# Patient Record
Sex: OTHER/UNKNOWN | Race: WHITE | NOT HISPANIC OR LATINO | Employment: PART TIME | URBAN - METROPOLITAN AREA
[De-identification: names, ages, dates, MRNs, and addresses within clinical notes are randomized per-mention and may not be internally consistent; named-entity substitution may affect disease eponyms.]

---
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
## 2023-03-10 ENCOUNTER — TELEPHONE (OUTPATIENT)
Dept: HEALTH INFORMATION MANAGEMENT | Facility: OTHER | Age: 18
End: 2023-03-10

## 2023-03-10 ENCOUNTER — OFFICE VISIT (OUTPATIENT)
Dept: URGENT CARE | Facility: PHYSICIAN GROUP | Age: 18
End: 2023-03-10
Payer: COMMERCIAL

## 2023-03-10 VITALS
WEIGHT: 217 LBS | HEART RATE: 92 BPM | OXYGEN SATURATION: 95 % | SYSTOLIC BLOOD PRESSURE: 126 MMHG | RESPIRATION RATE: 16 BRPM | HEIGHT: 66 IN | BODY MASS INDEX: 34.87 KG/M2 | DIASTOLIC BLOOD PRESSURE: 82 MMHG | TEMPERATURE: 98.2 F

## 2023-03-10 DIAGNOSIS — J02.9 PHARYNGITIS, UNSPECIFIED ETIOLOGY: ICD-10-CM

## 2023-03-10 DIAGNOSIS — J45.909 ASTHMA, UNSPECIFIED ASTHMA SEVERITY, UNSPECIFIED WHETHER COMPLICATED, UNSPECIFIED WHETHER PERSISTENT: ICD-10-CM

## 2023-03-10 LAB
FLUAV RNA SPEC QL NAA+PROBE: NEGATIVE
FLUBV RNA SPEC QL NAA+PROBE: NEGATIVE
RSV RNA SPEC QL NAA+PROBE: NEGATIVE
S PYO DNA SPEC NAA+PROBE: NOT DETECTED
SARS-COV-2 RNA RESP QL NAA+PROBE: NEGATIVE

## 2023-03-10 PROCEDURE — 87651 STREP A DNA AMP PROBE: CPT | Performed by: NURSE PRACTITIONER

## 2023-03-10 PROCEDURE — 99213 OFFICE O/P EST LOW 20 MIN: CPT | Performed by: NURSE PRACTITIONER

## 2023-03-10 PROCEDURE — 0241U POCT CEPHEID COV-2, FLU A/B, RSV - PCR: CPT | Performed by: NURSE PRACTITIONER

## 2023-03-10 RX ORDER — ALBUTEROL SULFATE 90 UG/1
1-2 AEROSOL, METERED RESPIRATORY (INHALATION) EVERY 4 HOURS PRN
Qty: 8.5 G | Refills: 0 | Status: SHIPPED | OUTPATIENT
Start: 2023-03-10

## 2023-03-10 ASSESSMENT — FIBROSIS 4 INDEX: FIB4 SCORE: 0.32

## 2023-03-10 ASSESSMENT — ENCOUNTER SYMPTOMS
DIARRHEA: 0
SHORTNESS OF BREATH: 0
WHEEZING: 0
SORE THROAT: 1
DIZZINESS: 1
COUGH: 0
NAUSEA: 0
VOMITING: 0
FEVER: 1

## 2023-03-10 NOTE — PROGRESS NOTES
Subjective:     Tiffany Perez is a 17 y.o. adult who presents for Sore Throat (X 1 day ,exposed to strep,lightheaded )      Felt feverish ysterday. Improvement today. States it feels lke a cough is about to start. Brother has strep.     Pharyngitis   This is a new problem. The current episode started yesterday. The pain is at a severity of 7/10. Associated symptoms include congestion. Pertinent negatives include no coughing, diarrhea, ear pain, shortness of breath or vomiting. Tiffany Perez has had exposure to strep. Tiffany Perez has tried nothing for the symptoms.     Past Medical History:   Diagnosis Date    Asthma        No past surgical history on file.    Social History     Socioeconomic History    Marital status: Single     Spouse name: Not on file    Number of children: Not on file    Years of education: Not on file    Highest education level: Not on file   Occupational History    Not on file   Tobacco Use    Smoking status: Never    Smokeless tobacco: Never   Vaping Use    Vaping Use: Never used   Substance and Sexual Activity    Alcohol use: Never    Drug use: Never    Sexual activity: Not on file   Other Topics Concern    Behavioral problems Not Asked    Interpersonal relationships Not Asked    Sad or not enjoying activities Not Asked    Suicidal thoughts Not Asked    Poor school performance Not Asked    Reading difficulties Not Asked    Speech difficulties Not Asked    Writing difficulties Not Asked    Inadequate sleep Not Asked    Excessive TV viewing Not Asked    Excessive video game use Not Asked    Inadequate exercise Not Asked    Sports related Not Asked    Poor diet Not Asked    Family concerns for drug/alcohol abuse Not Asked    Poor oral hygiene Not Asked    Bike safety Not Asked    Family concerns vehicle safety Not Asked   Social History Narrative    Not on file     Social Determinants of Health     Financial Resource Strain: Not on file   Food Insecurity: Not on file  "  Transportation Needs: Not on file   Physical Activity: Not on file   Stress: Not on file   Social Connections: Not on file   Intimate Partner Violence: Not on file   Housing Stability: Not on file        Family History   Problem Relation Age of Onset    Heart Disease Mother         unknown- had MI        Allergies   Allergen Reactions    Amoxicillin     Peanut-Derived        Review of Systems   Constitutional:  Positive for fever. Negative for malaise/fatigue.   HENT:  Positive for congestion and sore throat. Negative for ear pain.    Respiratory:  Negative for cough, shortness of breath and wheezing.    Gastrointestinal:  Negative for diarrhea, nausea and vomiting.   Neurological:  Positive for dizziness.   All other systems reviewed and are negative.     Objective:   /82   Pulse 92   Temp 36.8 °C (98.2 °F) (Temporal)   Resp 16   Ht 1.676 m (5' 6\")   Wt 98.4 kg (217 lb)   SpO2 95%   BMI 35.02 kg/m²     Physical Exam  Vitals reviewed.   Constitutional:       General: Tiffany Perez is not in acute distress.     Appearance: Tiffany Perez is well-developed. Tiffany Perez is not toxic-appearing.   HENT:      Head: Normocephalic and atraumatic.      Right Ear: External ear normal.      Left Ear: External ear normal.      Nose: Mucosal edema present.      Mouth/Throat:      Lips: Pink.      Mouth: Mucous membranes are moist.      Pharynx: Oropharynx is clear. No oropharyngeal exudate.      Tonsils: 0 on the right. 0 on the left.   Eyes:      Conjunctiva/sclera: Conjunctivae normal.   Cardiovascular:      Rate and Rhythm: Normal rate.   Pulmonary:      Effort: Pulmonary effort is normal. No respiratory distress.      Breath sounds: No stridor. No wheezing, rhonchi or rales.   Musculoskeletal:         General: Normal range of motion.      Cervical back: Normal range of motion.   Skin:     General: Skin is warm and dry.      Findings: No rash.   Neurological:      Mental Status: Tiffany Perez " is alert and oriented to person, place, and time.      GCS: GCS eye subscore is 4. GCS verbal subscore is 5. GCS motor subscore is 6.   Psychiatric:         Speech: Speech normal.         Behavior: Behavior normal.         Thought Content: Thought content normal.         Judgment: Judgment normal.       Assessment/Plan:   1. Pharyngitis, unspecified etiology  - POCT GROUP A STREP, PCR  - POCT CEPHEID COV-2, FLU A/B, RSV - PCR    2. Asthma, unspecified asthma severity, unspecified whether complicated, unspecified whether persistent  - albuterol 108 (90 Base) MCG/ACT Aero Soln inhalation aerosol; Inhale 1-2 Puffs every four hours as needed for Shortness of Breath.  Dispense: 8.5 g; Refill: 0  - Referral to establish with [facility] PCP    Results for orders placed or performed in visit on 03/10/23   POCT GROUP A STREP, PCR   Result Value Ref Range    POC Group A Strep, PCR Not Detected Not Detected, Invalid     Symptomatic care.  -Oral hydration and rest.   -Cough control: nonpharmacologic options for cough relief such as throat lozenges, hot tea, honey.  -Over the counter expectorant as directed; Guaifenesin (Mucinex).  -Tylenol or ibuprofen for pain and fever as directed.   -Warm salt water gargles.  -OTC Throat lozenges or spray (Cepacol).    Seek emergency medical care immediately for: Trouble breathing, persistent pain or pressure in the chest, confusion, inability to wake or stay awake, bluish lips or face, persistent tachycardia (fast heart rate), prolonged dizziness, persistent high grade fevers. Follow up for prolonged cough, persistent wheezing, persistent throat pain, difficulty swallowing, persistent fevers, leg swelling, or any other concerns. Follow up with your Primary Care Provider.     -Discussed viral etiology. Symptomatic care. COVID S&S, and self isolation guidelines. S&S of PNA with follow up. Stable Vitals. Patient requesting refill on inhaler and nebulizer. No current asthma symptoms. Discussed  filling rescue inhaler, with f/u with PCP or to return in clinic if she has an asthma exacerbation.     Differential diagnosis, natural history, supportive care, and indications for immediate follow-up discussed.

## 2023-03-10 NOTE — LETTER
March 10, 2023         Patient: Tiffany Perez   YOB: 2005   Date of Visit: 3/10/2023           To Whom it May Concern:    Tiffany Perez was seen in my clinic on 3/10/2023. Tiffany Perez may return to work on 3/13/2023.    If you have any questions or concerns, please don't hesitate to call.        Sincerely,           LOLIS Mora.  Electronically Signed

## 2023-05-30 ENCOUNTER — APPOINTMENT (OUTPATIENT)
Dept: RADIOLOGY | Facility: MEDICAL CENTER | Age: 18
End: 2023-05-30
Attending: OBSTETRICS & GYNECOLOGY
Payer: COMMERCIAL

## 2023-05-30 DIAGNOSIS — N93.9 HEMORRHAGE IN UTERUS: ICD-10-CM

## 2023-05-30 PROCEDURE — 76830 TRANSVAGINAL US NON-OB: CPT

## 2024-01-06 ENCOUNTER — APPOINTMENT (OUTPATIENT)
Dept: URGENT CARE | Facility: PHYSICIAN GROUP | Age: 19
End: 2024-01-06

## 2024-01-06 ENCOUNTER — OFFICE VISIT (OUTPATIENT)
Dept: URGENT CARE | Facility: PHYSICIAN GROUP | Age: 19
End: 2024-01-06
Payer: COMMERCIAL

## 2024-01-06 VITALS
BODY MASS INDEX: 35.03 KG/M2 | HEART RATE: 93 BPM | SYSTOLIC BLOOD PRESSURE: 110 MMHG | WEIGHT: 218 LBS | OXYGEN SATURATION: 97 % | DIASTOLIC BLOOD PRESSURE: 70 MMHG | HEIGHT: 66 IN | RESPIRATION RATE: 16 BRPM | TEMPERATURE: 98.7 F

## 2024-01-06 DIAGNOSIS — J45.30 MILD PERSISTENT ASTHMA WITHOUT COMPLICATION: ICD-10-CM

## 2024-01-06 DIAGNOSIS — J22 ACUTE RESPIRATORY INFECTION: ICD-10-CM

## 2024-01-06 DIAGNOSIS — R06.2 WHEEZING: ICD-10-CM

## 2024-01-06 DIAGNOSIS — R05.1 ACUTE COUGH: ICD-10-CM

## 2024-01-06 PROCEDURE — 94640 AIRWAY INHALATION TREATMENT: CPT | Performed by: NURSE PRACTITIONER

## 2024-01-06 PROCEDURE — 3074F SYST BP LT 130 MM HG: CPT | Performed by: NURSE PRACTITIONER

## 2024-01-06 PROCEDURE — 3078F DIAST BP <80 MM HG: CPT | Performed by: NURSE PRACTITIONER

## 2024-01-06 PROCEDURE — 99214 OFFICE O/P EST MOD 30 MIN: CPT | Mod: 25 | Performed by: NURSE PRACTITIONER

## 2024-01-06 RX ORDER — ALBUTEROL SULFATE 0.63 MG/3ML
0.63 SOLUTION RESPIRATORY (INHALATION) EVERY 4 HOURS PRN
Qty: 30 ML | Refills: 0 | Status: SHIPPED | OUTPATIENT
Start: 2024-01-06

## 2024-01-06 RX ORDER — METHYLPREDNISOLONE 4 MG/1
TABLET ORAL
Qty: 21 TABLET | Refills: 0 | Status: SHIPPED | OUTPATIENT
Start: 2024-01-06 | End: 2024-02-01

## 2024-01-06 RX ORDER — ALBUTEROL SULFATE 90 UG/1
1-2 AEROSOL, METERED RESPIRATORY (INHALATION) EVERY 6 HOURS PRN
Qty: 8.5 G | Refills: 0 | Status: SHIPPED | OUTPATIENT
Start: 2024-01-06

## 2024-01-06 RX ORDER — IPRATROPIUM BROMIDE AND ALBUTEROL SULFATE 2.5; .5 MG/3ML; MG/3ML
3 SOLUTION RESPIRATORY (INHALATION) ONCE
Status: COMPLETED | OUTPATIENT
Start: 2024-01-06 | End: 2024-01-06

## 2024-01-06 RX ORDER — AZITHROMYCIN 250 MG/1
TABLET, FILM COATED ORAL
Qty: 6 TABLET | Refills: 0 | Status: SHIPPED | OUTPATIENT
Start: 2024-01-06

## 2024-01-06 RX ADMIN — IPRATROPIUM BROMIDE AND ALBUTEROL SULFATE 3 ML: 2.5; .5 SOLUTION RESPIRATORY (INHALATION) at 14:03

## 2024-01-06 ASSESSMENT — FIBROSIS 4 INDEX: FIB4 SCORE: 0.33

## 2024-01-06 NOTE — PROGRESS NOTES
Tiffany Perez is a 18 y.o. adult who presents for Shortness of Breath (SOB, cough attacks, wheezing, having difficulty sleeping at night since 12/15. 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 Hx of asthma. ) and Medication Refill (Albuterol)      HPI  This is a new problem. Tiffany Perez is a 18 y.o. patient who presents to urgent care with c/o: sob and coughing attacks. Hx of asthma. Ran out of her albuterol ( it ). Yesterday gave herself 3 albuterol treatments back-to-back even though the nebulized medication was .  She felt so short of breath that she needed to do something.  She reports she almost went to the emergency room because she felt like she could not breathe but then started to relax and she thinks the medicine helped her.  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  He had sanded floors and there was a lot of dust.  She reports she was wearing the proper mask and that exacerbated her asthma.  She has been having a difficult time getting it under control since then.  She has developed a worsening cough.  Her cough is productive of thick sputum.  She has felt chilled.  She is not wondering if it is a deeper respiratory infection as well as her asthma.  Denies nausea, vomiting, chest pain, lightheadedness, ear pain, sore throat, body aches.  No other aggravating leaving factors.  History of asthma for most of her life.  She has never been intubated.    ROS See HPI    Allergies:       Allergies   Allergen Reactions    Amoxicillin     Peanut-Derived        PMSFS Hx:  Past Medical History:   Diagnosis Date    Asthma      History reviewed. No pertinent surgical history.  Family History   Problem Relation Age of Onset    Heart Disease Mother         unknown- had MI     Social History     Tobacco Use    Smoking status: Never    Smokeless tobacco: Never   Substance Use Topics    Alcohol use:  "Never       Problems:   Patient Active Problem List   Diagnosis    Rash    Oligomenorrhea    Eczema    Polycythemia    Weight gain    Anxiety       Medications:   Current Outpatient Medications on File Prior to Visit   Medication Sig Dispense Refill    albuterol 108 (90 Base) MCG/ACT Aero Soln inhalation aerosol Inhale 1-2 Puffs every four hours as needed for Shortness of Breath. 8.5 g 0    albuterol (PROVENTIL) 2.5 mg/0.5 mL Nebu Soln Take  by nebulization one time.      albuterol 108 (90 Base) MCG/ACT Aero Soln inhalation aerosol Inhale 2 Puffs every 6 hours as needed for Shortness of Breath. 8.5 g 1    cetirizine (ZYRTEC) 10 MG Tab Take 10 mg by mouth every day.      albuterol (PROVENTIL) 2.5mg/3ml NEBU 3 mL by Nebulization route every four hours as needed for Shortness of Breath for 22 doses. With one nebulizer machine 22 mL 1     No current facility-administered medications on file prior to visit.        Objective:     /70 (BP Location: Right arm, Patient Position: Sitting, BP Cuff Size: Adult)   Pulse 93   Temp 37.1 °C (98.7 °F) (Temporal)   Resp 16   Ht 1.676 m (5' 6\")   Wt 98.9 kg (218 lb)   SpO2 97%   BMI 35.19 kg/m²     Physical Exam  Nursing note reviewed.   Constitutional:       General: Tiffany Perez is not in acute distress.     Appearance: Normal appearance. Tiffany Perez is well-developed and well-groomed. Tiffany Perez is not toxic-appearing.   HENT:      Head: Normocephalic and atraumatic.      Right Ear: External ear normal.      Left Ear: External ear normal.      Nose: Nose normal.   Eyes:      General:         Right eye: No discharge.         Left eye: No discharge.      Conjunctiva/sclera: Conjunctivae normal.      Pupils: Pupils are equal, round, and reactive to light.   Cardiovascular:      Rate and Rhythm: Normal rate and regular rhythm.      Pulses: Normal pulses.      Heart sounds: Normal heart sounds.   Pulmonary:      Effort: Pulmonary effort is normal. No " accessory muscle usage.      Breath sounds: Normal air entry. Wheezing present.   Musculoskeletal:      Cervical back: Neck supple.   Lymphadenopathy:      Cervical: No cervical adenopathy.      Upper Body:      Right upper body: No supraclavicular adenopathy.      Left upper body: No supraclavicular adenopathy.   Skin:     General: Skin is warm and dry.      Capillary Refill: Capillary refill takes less than 2 seconds.   Neurological:      Mental Status: Tiffany Perez is alert and oriented to person, place, and time.   Psychiatric:         Mood and Affect: Mood normal.         Behavior: Behavior normal.     Demonstration &/or evaluation of pt utilization of a nebulizer and evaluation of results. Pt tolerated well. No adverse events. SpO2 post treatment 98%.  Auscultation posttreatment Improved Vt. Almost complete resolve of her wheezing but some wheezing persists at bases. Pt reports improved WOB.       Assessment /Associated Orders:      1. Acute respiratory infection  azithromycin (ZITHROMAX) 250 MG Tab      2. Mild persistent asthma without complication  methylPREDNISolone (MEDROL DOSEPAK) 4 MG Tablet Therapy Pack      3. Wheezing  ipratropium-albuterol (DUONEB) nebulizer solution    albuterol 108 (90 Base) MCG/ACT Aero Soln inhalation aerosol    albuterol (ACCUNEB) 0.63 MG/3ML nebulizer solution      4. Acute cough            Medical Decision Making:    Tiffany is a very pleasant 18 y.o. adult who is clinically stable at today's acute urgent care visit.  No acute distress noted.  VSS. Appropriate for outpatient care at this time.   Acute problem today with uncertain prognosis.   Educated in proper administration of  prescription medication(s) ordered today including safety, possible SE, risks, benefits, rationale and alternatives to therapy.   Start Medrol marcie tomorrow morning. Do not take additional NSAIDS or steroids while taking RX medication. Educated on risk of lower immunity in short term, weight  changes, mood changes, increased serum glucose and elevated BP while taking steroids.   Keep well hydrated    Discussed Dx, management options (risks,benefits, and alternatives to planned treatment), natural progression and supportive care.  Expressed understanding and the treatment plan was agreed upon.   Questions were encouraged and answered   Return to urgent care prn if new or worsening sx or if there is no improvement in condition prn.    Educated in Red flags and indications to immediately call 911 or present to the Emergency Department.       Time I spent evaluating Tiffany Perez in urgent care today was 30  minutes. This time includes preparing for visit, reviewing any pertinent notes or test results, counseling/education, exam, obtaining HPI, interpretation of lab tests, medication management and documentation as indicated above.Time does not include separately billable procedures noted .       Please note that this dictation was created using voice recognition software. I have worked with consultants from the vendor as well as technical experts from [facility] to optimize the interface. I have made every reasonable attempt to correct obvious errors, but I expect that there are errors of grammar and possibly content that I did not discover before finalizing the note.  This note was electronically signed by provider

## 2024-02-01 ENCOUNTER — HOSPITAL ENCOUNTER (EMERGENCY)
Facility: MEDICAL CENTER | Age: 19
End: 2024-02-01
Attending: EMERGENCY MEDICINE

## 2024-02-01 ENCOUNTER — APPOINTMENT (OUTPATIENT)
Dept: RADIOLOGY | Facility: MEDICAL CENTER | Age: 19
End: 2024-02-01
Attending: EMERGENCY MEDICINE

## 2024-02-01 VITALS
TEMPERATURE: 97.1 F | HEART RATE: 63 BPM | WEIGHT: 242.51 LBS | DIASTOLIC BLOOD PRESSURE: 87 MMHG | RESPIRATION RATE: 18 BRPM | OXYGEN SATURATION: 95 % | HEIGHT: 66 IN | SYSTOLIC BLOOD PRESSURE: 123 MMHG | BODY MASS INDEX: 38.97 KG/M2

## 2024-02-01 DIAGNOSIS — J45.21 INTERMITTENT ASTHMA WITH ACUTE EXACERBATION, UNSPECIFIED ASTHMA SEVERITY: ICD-10-CM

## 2024-02-01 LAB
FLUAV RNA SPEC QL NAA+PROBE: NEGATIVE
FLUBV RNA SPEC QL NAA+PROBE: NEGATIVE
RSV RNA SPEC QL NAA+PROBE: NEGATIVE
SARS-COV-2 RNA RESP QL NAA+PROBE: NOTDETECTED

## 2024-02-01 PROCEDURE — A9270 NON-COVERED ITEM OR SERVICE: HCPCS | Performed by: EMERGENCY MEDICINE

## 2024-02-01 PROCEDURE — 700102 HCHG RX REV CODE 250 W/ 637 OVERRIDE(OP): Performed by: EMERGENCY MEDICINE

## 2024-02-01 PROCEDURE — 71045 X-RAY EXAM CHEST 1 VIEW: CPT

## 2024-02-01 PROCEDURE — 99284 EMERGENCY DEPT VISIT MOD MDM: CPT

## 2024-02-01 PROCEDURE — 0241U HCHG SARS-COV-2 COVID-19 NFCT DS RESP RNA 4 TRGT ED POC: CPT

## 2024-02-01 RX ORDER — BENZONATATE 100 MG/1
200 CAPSULE ORAL ONCE
Status: COMPLETED | OUTPATIENT
Start: 2024-02-01 | End: 2024-02-01

## 2024-02-01 RX ORDER — GUAIFENESIN 600 MG/1
600 TABLET, EXTENDED RELEASE ORAL ONCE
Status: COMPLETED | OUTPATIENT
Start: 2024-02-01 | End: 2024-02-01

## 2024-02-01 RX ORDER — PREDNISONE 20 MG/1
40 TABLET ORAL DAILY
Qty: 10 TABLET | Refills: 0 | Status: SHIPPED | OUTPATIENT
Start: 2024-02-01 | End: 2024-02-06

## 2024-02-01 RX ADMIN — GUAIFENESIN 600 MG: 600 TABLET, EXTENDED RELEASE ORAL at 14:20

## 2024-02-01 RX ADMIN — BENZONATATE 200 MG: 100 CAPSULE ORAL at 14:20

## 2024-02-01 ASSESSMENT — FIBROSIS 4 INDEX: FIB4 SCORE: 0.33

## 2024-02-01 NOTE — ED NOTES
Patient ambulated to [facility] without incident. Primary assessment complete. Patient oriented to care area.

## 2024-02-01 NOTE — ED NOTES
Patient revitaled. Discharge orders received. Discharge instructions provided. AVS provided and reviewed with patient. Patient AOx4 and ambulatory. No IV placed during ED visit. Patient discharged to self without incident.

## 2024-02-01 NOTE — DISCHARGE INSTRUCTIONS
Your chest x-ray here was normal, and you do not have flu, COVID, or RSV.  This does appear to be a simple asthma exacerbation, or series of exacerbations.  There are other viral illnesses that can also make your symptoms worse, which we are not able to test for.    Because of your worsening symptoms, please use the few days of steroids that we have prescribed.  Continue your albuterol as needed.  You can also use over-the-counter decongestants such as Mucinex.     Please use the clinics listed here to establish primary care for help with this and other health concerns, and to look for an affordable asthma controller medication.  These clinics listed here do sliding scale payments based on income.    Return here for severe or worsening symptoms in the meantime.

## 2024-02-01 NOTE — ED PROVIDER NOTES
"ER Provider Note    Scribed for Raghav Person M.D. by Fco Lopez. 2/1/2024  1:41 PM    Primary Care Provider: Pcp Pt States None    CHIEF COMPLAINT  Chief Complaint   Patient presents with    Shortness of Breath     SOB during exertion. Hx of asthma. Pt took albuterol inhaler 20 mins ago. Denies chest pain.     EXTERNAL RECORDS REVIEWED  Outpatient Notes Patient presented to Urgent Care 1/6/24 with complaints of shortness of breath and coughing attacks. Patient received a nebulizer treatment.     HPI/ROS  LIMITATION TO HISTORY   Select: : None    OUTSIDE HISTORIAN(S):  None.    Tiffany Perez is a 18 y.o. adult who presents to the ED complaining of shortness of breath with exertion onset over 1 month ago. The patient reports she had a \"full blown\" asthma attack earlier today, prompting her to present to the ED. The patient reports if she did not have quick access to her albuterol inhaler today, she would have had to call EMS. The patient reports a medical history of asthma, and states she was prescribed steroids for the shortness of breath, and they stopped working two weeks ago. The patient reports since then, she has developed a productive cough and mild chest pain. Patient reports she was on Dulera for awhile, but stopped taking it because she started to feel better. The patient denies any other medical conditions or daily medications.The patient states she does not have a PCP or insurance, and gets her albuterol refilled at Urgent Care.     PAST MEDICAL HISTORY  Past Medical History:   Diagnosis Date    Asthma        SURGICAL HISTORY  No past surgical history noted.    FAMILY HISTORY  Family History   Problem Relation Age of Onset    Heart Disease Mother         unknown- had MI       SOCIAL HISTORY   reports that Tiffany Perez has never smoked. Tiffany Perez has never used smokeless tobacco. Tiffany Perez reports that Tiffany Perez does not drink alcohol and does not use drugs.    CURRENT " "MEDICATIONS  Discharge Medication List as of 2/1/2024  3:38 PM        CONTINUE these medications which have NOT CHANGED    Details   !! albuterol 108 (90 Base) MCG/ACT Aero Soln inhalation aerosol Inhale 1-2 Puffs every 6 hours as needed for Shortness of Breath (wheezing)., Disp-8.5 g, R-0, Normal      albuterol (ACCUNEB) 0.63 MG/3ML nebulizer solution Take 3 mL by nebulization every four hours as needed for Shortness of Breath.Dispense 1 boxDisp-30 mL, R-0, Normal      azithromycin (ZITHROMAX) 250 MG Tab Take 2 tablets PO on day one, then 1 tablet PO on day two to five., Disp-6 Tablet, R-0, Normal      !! albuterol 108 (90 Base) MCG/ACT Aero Soln inhalation aerosol Inhale 1-2 Puffs every four hours as needed for Shortness of Breath., Disp-8.5 g, R-0, Normal      albuterol (PROVENTIL) 2.5 mg/0.5 mL Nebu Soln Take  by nebulization one time., Historical Med      !! albuterol 108 (90 Base) MCG/ACT Aero Soln inhalation aerosol Inhale 2 Puffs every 6 hours as needed for Shortness of Breath., Disp-8.5 g, R-1, Normal      cetirizine (ZYRTEC) 10 MG Tab Take 10 mg by mouth every day., Historical Med      albuterol (PROVENTIL) 2.5mg/3ml NEBU 3 mL by Nebulization route every four hours as needed for Shortness of Breath for 22 doses. With one nebulizer machine, Disp-22 mL, R-1, Print Rx Paper       !! - Potential duplicate medications found. Please discuss with provider.          ALLERGIES  Amoxicillin and Peanut-derived    PHYSICAL EXAM  VITAL SIGNS: /87   Pulse 63   Temp 36.2 °C (97.1 °F) (Temporal)   Resp 18   Ht 1.676 m (5' 6\")   Wt 110 kg (242 lb 8.1 oz)   SpO2 95%   BMI 39.14 kg/m²   Pulse ox interpretation: I interpret this pulse ox as normal.  Constitutional: Alert in no apparent distress.  HENT: No signs of trauma, Bilateral external ears normal, Nose normal.   Eyes: Conjunctiva normal, Non-icteric.   Neck: Normal range of motion, Supple, No stridor.   Lymphatic: No lymphadenopathy noted.   Cardiovascular: " Regular rate and rhythm, no murmurs.   Thorax & Lungs: Normal breath sounds, No respiratory distress, No wheezing, clear lungs. Occasional congestive cough.  Abdomen: Bowel sounds normal, Soft, No tenderness, No masses, No pulsatile masses. No peritoneal signs.  Skin: Warm, Dry, No erythema, No rash.   Back: No midline bony tenderness.   Extremities: Intact distal pulses, No edema, No cyanosis.  Musculoskeletal: Good range of motion in all major joints. No or major deformities noted.   Neurologic: Alert , Normal motor function, Normal sensory function, No focal deficits noted.   Psychiatric: Affect normal, Judgment normal, Mood normal.       DIAGNOSTIC STUDIES    Labs:   Labs Reviewed   POCT COV-2, FLU A/B, RSV BY PCR   POC COV-2, FLU A/B, RSV BY PCR       Radiology:   The attending emergency physician has independently interpreted the diagnostic imaging associated with this visit and am waiting the final reading from the radiologist.   Preliminary interpretation is a follows: Unremarkable chest x-ray    Radiologist interpretation:   DX-CHEST-LIMITED (1 VIEW)   Final Result      No evidence of acute cardiopulmonary process.          COURSE & MEDICAL DECISION MAKING     ED Observation Status? No; Patient does not meet criteria for ED Observation.     INITIAL ASSESSMENT, COURSE AND PLAN  Care Narrative:     1:41 PM - Patient presents to the ED with worsening shortness of breath.  She has a history of asthma, and has been self-medicating with her albuterol, but using it more frequently than is ideal.  On arrival, she has normal vital signs, no increased work of breathing, no wheezing on exam.  Patient evaluated at bedside and discussed plan of care, including viral swab and imaging of the chest. Patient will be treated with Mucinex 600 mg PO and Tessalon 200 mg PO. Ordered for POCT CoV-2, Flu A/B, and RSV by PCR and DX-Chest to evaluate Tiffany Perez's symptoms. Differential diagnoses include but not limited to:  Flu vs Covid vs RSV. Less likely, bacterial pneumonia.     2:47 PM - Patient was reevaluated at bedside. Discussed radiology results with the patient and informed them the chest XR is normal.     3:27 PM - Patient was reevaluated at bedside. Discussed lab results with the patient and informed them the viral swab was negative. I informed this patient this is most likely a simple asthma exacerbation, and I plan to discharge them home. I informed the patient I will be prescribing her a few days of steroids to help her worsening symptoms. Patient was advised to continue using her albuterol as needed, and take decongestants like Mucinex as well. I informed the patient I'm including clinics in her paperwork to establish primary care to help with today's presentation and other health concerns. Patient was advised to return to [facility] ED for severe or worsening symptoms. Patient was given the opportunity for questions. I addressed all questions or concerns at this time and they verbalize agreement to the plan of care.         ADDITIONAL PROBLEM LIST  Chronic asthma, as well as acute concerns.  Not established with primary care.    DISPOSITION AND DISCUSSIONS  I have discussed management of the patient with the following physicians and CHASITY's:  None    Discussion of management with other QHP or appropriate source(s): None     Escalation of care considered, and ultimately not performed: blood analysis.  Though considered, was ultimately unnecessary, due to lack of any findings concerning for systemic illness.    Barriers to care at this time, including but not limited to: Patient does not have established PCP.     Decision tools and prescription drugs considered including, but not limited to: Medication modification was performed to start the patient on a short course of oral steroids.    The patient will return for new or worsening symptoms and is stable at the time of discharge.    The patient is referred to a primary  physician for blood pressure management, diabetic screening, and for all other preventative health concerns.      DISPOSITION:  Patient will be discharged home in stable condition.    FOLLOW UP:  [facility]  [address]  [facility] [address]        [facility] - Primary Care and [facility] Medicine  [address] [facility] [address]  [phone]        [facility] - Behavioral [facility]  [address]  [facility] [address]  [phone]          OUTPATIENT MEDICATIONS:  Discharge Medication List as of 2/1/2024  3:38 PM        START taking these medications    Details   predniSONE (DELTASONE) 20 MG Tab Take 2 Tablets by mouth every day for 5 days., Disp-10 Tablet, R-0, Normal               FINAL DIAGNOSIS  1. Intermittent asthma with acute exacerbation, unspecified asthma severity         Fco SALAS (Kelseyibsasha), am scribing for, and in the presence of, Raghav Person M.D..    Electronically signed by: Fco Masters), 2/1/2024    IRaghav M.D. personally performed the services described in this documentation, as scribed by Fco Lopez in my presence, and it is both accurate and complete.

## 2024-02-01 NOTE — ED TRIAGE NOTES
"Chief Complaint   Patient presents with    Shortness of Breath     SOB during exertion. Hx of asthma. Pt took albuterol inhaler 20 mins ago. Denies chest pain.     Pt able to speak in full sentences.    BP (!) 161/98   Pulse (!) 111   Temp 36.3 °C (97.3 °F) (Temporal)   Resp 17   Ht 1.676 m (5' 6\")   Wt 110 kg (242 lb 8.1 oz)   SpO2 98%   BMI 39.14 kg/m²     "

## 2024-02-21 ENCOUNTER — APPOINTMENT (OUTPATIENT)
Dept: TELEHEALTH | Facility: TELEMEDICINE | Age: 19
End: 2024-02-21

## 2024-06-27 ENCOUNTER — OFFICE VISIT (OUTPATIENT)
Dept: MEDICAL GROUP | Facility: MEDICAL CENTER | Age: 19
End: 2024-06-27
Payer: MEDICAID

## 2024-06-27 VITALS
HEIGHT: 66 IN | OXYGEN SATURATION: 96 % | SYSTOLIC BLOOD PRESSURE: 110 MMHG | BODY MASS INDEX: 37.9 KG/M2 | DIASTOLIC BLOOD PRESSURE: 98 MMHG | HEART RATE: 76 BPM | WEIGHT: 235.8 LBS | RESPIRATION RATE: 16 BRPM | TEMPERATURE: 97 F

## 2024-06-27 DIAGNOSIS — Z13.0 SCREENING FOR ENDOCRINE, NUTRITIONAL, METABOLIC AND IMMUNITY DISORDER: ICD-10-CM

## 2024-06-27 DIAGNOSIS — Z13.29 SCREENING FOR ENDOCRINE, NUTRITIONAL, METABOLIC AND IMMUNITY DISORDER: ICD-10-CM

## 2024-06-27 DIAGNOSIS — J45.909 MODERATE ASTHMA WITHOUT COMPLICATION, UNSPECIFIED WHETHER PERSISTENT: ICD-10-CM

## 2024-06-27 DIAGNOSIS — Z13.21 SCREENING FOR ENDOCRINE, NUTRITIONAL, METABOLIC AND IMMUNITY DISORDER: ICD-10-CM

## 2024-06-27 DIAGNOSIS — N83.201 CYST OF RIGHT OVARY: ICD-10-CM

## 2024-06-27 DIAGNOSIS — F41.9 ANXIETY: ICD-10-CM

## 2024-06-27 DIAGNOSIS — R06.2 WHEEZING: ICD-10-CM

## 2024-06-27 DIAGNOSIS — Z13.228 SCREENING FOR ENDOCRINE, NUTRITIONAL, METABOLIC AND IMMUNITY DISORDER: ICD-10-CM

## 2024-06-27 PROBLEM — R21 RASH: Status: RESOLVED | Noted: 2022-03-09 | Resolved: 2024-06-27

## 2024-06-27 PROCEDURE — 99213 OFFICE O/P EST LOW 20 MIN: CPT

## 2024-06-27 RX ORDER — ALBUTEROL SULFATE 90 UG/1
1-2 AEROSOL, METERED RESPIRATORY (INHALATION) EVERY 6 HOURS PRN
Qty: 8.5 G | Refills: 5 | Status: SHIPPED | OUTPATIENT
Start: 2024-06-27

## 2024-06-27 ASSESSMENT — PATIENT HEALTH QUESTIONNAIRE - PHQ9: CLINICAL INTERPRETATION OF PHQ2 SCORE: 0

## 2024-06-27 NOTE — PROGRESS NOTES
"Subjective:     CC:  Diagnoses of Moderate asthma without complication, unspecified whether persistent, Wheezing, Screening for endocrine, nutritional, metabolic and immunity disorder, Cyst of right ovary, and Anxiety were pertinent to this visit.    HISTORY OF THE PRESENT ILLNESS: Patient is a 19 y.o. adult. This pleasant patient is here today to establish care.    Moderate asthma without complication  This is a chronic condition.  Any significant flare-ups since last visit: No  Onset: Symptoms present since young age  Symptoms include: sob, wheezing  Details: worse with URI, worse with exercise, worse with exposure to cold air  Severity: moderated  They occur 3 per week and are stable.  Problems with exercise induced coughing, wheezing, or shortness of breath?  Yes, describe when walking up stairs  Has sleep been disturbed due to symptoms: No  How often have you had to use your albuterol for relief of symptoms?  3x weeky  Current medications:   Albuterol inhaler and nebulizer.     Cyst of right ovary  US with 4.8cm right ovarian single cyst. She does have some abdominal pain. She does report that her periods have become regular. He flow is \"strong\" and lasts 7 days. She reports some cramping. Denies any abnormal vaginal discharge.     Anxiety  Patient states that she would like to see a counselor now that her medicaid is active again.  She is also interested in being evaluated by a psychiatrist for her anxiety and possible ADHD work up.       Health Maintenance: reviewed and completed per patient preference.     ROS:   - CONSTITUTIONAL: Denies weight loss, fever and chills.  - HEENT: Denies changes in vision and hearing.  - RESPIRATORY: Denies cough. Endorses SOB and wheezing.   - CV: Denies palpitations and CP.  - GI: Denies abdominal pain, nausea, vomiting and diarrhea.  - : Denies dysuria and urinary frequency.  - MSK: Denies myalgia and joint pain.  - SKIN: Denies rash and pruritus.  - NEUROLOGICAL: Denies " headache and syncope.  - PSYCHIATRIC: Denies recent changes in mood. Endorses anxiety.            Social History     Socioeconomic History    Marital status: Single     Spouse name: Not on file    Number of children: Not on file    Years of education: Not on file    Highest education level: Not on file   Occupational History    Not on file   Tobacco Use    Smoking status: Never    Smokeless tobacco: Never   Vaping Use    Vaping status: Never Used   Substance and Sexual Activity    Alcohol use: Yes     Comment: once in awhile    Drug use: Never    Sexual activity: Not Currently   Other Topics Concern    Behavioral problems Not Asked    Interpersonal relationships Not Asked    Sad or not enjoying activities Not Asked    Suicidal thoughts Not Asked    Poor school performance Not Asked    Reading difficulties Not Asked    Speech difficulties Not Asked    Writing difficulties Not Asked    Inadequate sleep Not Asked    Excessive TV viewing Not Asked    Excessive video game use Not Asked    Inadequate exercise Not Asked    Sports related Not Asked    Poor diet Not Asked    Family concerns for drug/alcohol abuse Not Asked    Poor oral hygiene Not Asked    Bike safety Not Asked    Family concerns vehicle safety Not Asked   Social History Narrative    Not on file     Social Determinants of Health     Financial Resource Strain: Not on file   Food Insecurity: Not on file   Transportation Needs: Not on file   Physical Activity: Not on file   Stress: Not on file   Social Connections: Not on file   Intimate Partner Violence: Not on file   Housing Stability: Not on file      Allergies   Allergen Reactions    Amoxicillin     Peanut-Derived             Current Outpatient Medications:     albuterol, 1-2 Puff, Inhalation, Q6HRS PRN    cetirizine, 10 mg, Oral, DAILY, Taking    albuterol, 2.5 mg, Nebulization, Q4HRS PRN   Objective:     Exam: BP (!) 110/98 (BP Location: Right arm, Patient Position: Sitting, BP Cuff Size: Adult)   Pulse  "76   Temp 36.1 °C (97 °F) (Temporal)   Resp 16   Ht 1.676 m (5' 6\")   Wt 107 kg (235 lb 12.8 oz)   SpO2 96%  Body mass index is 38.06 kg/m².    Physical Exam:  Constitutional: Alert, no distress, well-groomed.  Skin: Warm, dry, good turgor, no rashes in visible areas.  Eye: Equal, round and reactive, conjunctiva clear, lids normal.  ENMT: Lips without lesions, good dentition, moist mucous membranes.  Neck: Trachea midline, no masses, no thyromegaly.  Respiratory: Unlabored respiratory effort, no cough.  Abd: soft, non tender, non distended, normal BS  MSK: Normal gait, moves all extremities.  Neuro: Grossly non-focal.   Psych: Alert and oriented x3, normal affect and mood.    Labs: Reviewed    Assessment & Plan:   19 y.o. adult with the following -  1. Moderate asthma without complication, unspecified whether persistent  2. Wheezing  Chronic, stable. Patient is currently not using any maintenance therapy and albuterol as a rescue treatment. Her symptoms are present 3x weekly and are exacerbated by cold weather, and URI and activity. It was recommended that she avoid sick people, allergens such as dander and mold, and avoid smoke. We discussed signs and symptoms that warrant further evaluation.   - albuterol 108 (90 Base) MCG/ACT Aero Soln inhalation aerosol; Inhale 1-2 Puffs every 6 hours as needed for Shortness of Breath (wheezing).  Dispense: 8.5 g; Refill: 5  - PULMONARY FUNCTION TESTS -Test requested: Complete Pulmonary Function Test; Future    3. Screening for endocrine, nutritional, metabolic and immunity disorder  - HIV AG/AB COMBO ASSAY SCREENING; Future  - HEP C VIRUS ANTIBODY; Future  - CBC WITHOUT DIFFERENTIAL; Future  - Comp Metabolic Panel; Future  - HEMOGLOBIN A1C; Future  - Lipid Profile; Future  - TSH WITH REFLEX TO FT4; Future  - VITAMIN D,25 HYDROXY (DEFICIENCY); Future    4. Cyst of right ovary  Acute on chronic issue. US in 22 with one 4 cm cyst. Patient reports ongoing pain and discomfort " and would like to repeat US.  - US-PELVIC COMPLETE (TRANSABDOMINAL/TRANSVAGINAL) (COMBO); Future    5. Anxiety  - Chronic condition; uncontrolled. Reviewed treatment options including medication and counseling/therapy.  - Cognitive behavioral therapy (CBT) is an effective treatment for RANI as are SSRIs.  SSRIs usually take 4-6 wks to titrate to have an effect. She is not interested in any medications at this point.  - Counseling for stress mgmt techniques  - Informed the patient of [employer] and the resources that are available  -- ER for SI/SA/HI  - Referral to Psychology  - Referral to Psychiatry        Return in about 2 weeks (around 7/11/2024) for PAP.    Please note that this dictation was created using voice recognition software. I have made every reasonable attempt to correct obvious errors, but I expect that there are errors of grammar and possibly content that I did not discover before finalizing the note.

## 2024-06-27 NOTE — ASSESSMENT & PLAN NOTE
"US with 4.8cm right ovarian single cyst. She does have some abdominal pain. She does report that her periods have become regular. He flow is \"strong\" and lasts 7 days. She reports some cramping. Denies any abnormal vaginal discharge.   "

## 2024-06-27 NOTE — ASSESSMENT & PLAN NOTE
This is a chronic condition.  Any significant flare-ups since last visit: No  Onset: Symptoms present since young age  Symptoms include: sob, wheezing  Details: worse with URI, worse with exercise, worse with exposure to cold air  Severity: moderated  They occur 3 per week and are stable.  Problems with exercise induced coughing, wheezing, or shortness of breath?  Yes, describe when walking up stairs  Has sleep been disturbed due to symptoms: No  How often have you had to use your albuterol for relief of symptoms?  3x weeky  Current medications:   Albuterol inhaler and nebulizer.

## 2024-06-27 NOTE — ASSESSMENT & PLAN NOTE
Patient states that she would like to see a counselor now that her medicaid is active again.  She is also interested in being evaluated by a psychiatrist for her anxiety and possible ADHD work up.

## 2024-07-12 ENCOUNTER — HOSPITAL ENCOUNTER (OUTPATIENT)
Facility: MEDICAL CENTER | Age: 19
End: 2024-07-12
Payer: MEDICAID

## 2024-07-12 ENCOUNTER — OFFICE VISIT (OUTPATIENT)
Dept: MEDICAL GROUP | Facility: MEDICAL CENTER | Age: 19
End: 2024-07-12
Payer: MEDICAID

## 2024-07-12 VITALS
SYSTOLIC BLOOD PRESSURE: 102 MMHG | WEIGHT: 234 LBS | OXYGEN SATURATION: 99 % | DIASTOLIC BLOOD PRESSURE: 86 MMHG | HEART RATE: 78 BPM | TEMPERATURE: 97.5 F | RESPIRATION RATE: 16 BRPM | BODY MASS INDEX: 37.61 KG/M2 | HEIGHT: 66 IN

## 2024-07-12 DIAGNOSIS — Z01.419 WELL WOMAN EXAM: ICD-10-CM

## 2024-07-12 DIAGNOSIS — Z13.79 GENETIC TESTING: ICD-10-CM

## 2024-07-12 PROCEDURE — 87624 HPV HI-RISK TYP POOLED RSLT: CPT

## 2024-07-12 PROCEDURE — 87480 CANDIDA DNA DIR PROBE: CPT

## 2024-07-12 PROCEDURE — 88175 CYTOPATH C/V AUTO FLUID REDO: CPT

## 2024-07-12 PROCEDURE — 87510 GARDNER VAG DNA DIR PROBE: CPT

## 2024-07-12 PROCEDURE — 99213 OFFICE O/P EST LOW 20 MIN: CPT | Mod: 25

## 2024-07-12 PROCEDURE — 3079F DIAST BP 80-89 MM HG: CPT

## 2024-07-12 PROCEDURE — 99395 PREV VISIT EST AGE 18-39: CPT | Mod: 25

## 2024-07-12 PROCEDURE — 87591 N.GONORRHOEAE DNA AMP PROB: CPT

## 2024-07-12 PROCEDURE — 3074F SYST BP LT 130 MM HG: CPT

## 2024-07-12 PROCEDURE — 87491 CHLMYD TRACH DNA AMP PROBE: CPT

## 2024-07-12 PROCEDURE — 87660 TRICHOMONAS VAGIN DIR PROBE: CPT

## 2024-07-12 ASSESSMENT — LIFESTYLE VARIABLES
DURING THE PAST 12 MONTHS, ON HOW MANY DAYS DID YOU DRINK MORE THAN A FEW SIPS OF BEER, WINE, OR ANY DRINK CONTAINING ALCOHOL: 5
DURING THE PAST 12 MONTHS, ON HOW MANY DAYS DID YOU USE ANY MARIJUANA: 0
PART A TOTAL SCORE: 5
DURING THE PAST 12 MONTHS, ON HOW MANY DAYS DID YOU USE ANY TOBACCO OR NICOTINE PRODUCTS: 0
DURING THE PAST 12 MONTHS, ON HOW MANY DAYS DID YOU USE ANYTHING ELSE TO GET HIGH: 0

## 2024-07-13 LAB
CANDIDA DNA VAG QL PROBE+SIG AMP: POSITIVE
G VAGINALIS DNA VAG QL PROBE+SIG AMP: POSITIVE
T VAGINALIS DNA VAG QL PROBE+SIG AMP: NEGATIVE

## 2024-07-19 DIAGNOSIS — N76.0 BV (BACTERIAL VAGINOSIS): ICD-10-CM

## 2024-07-19 DIAGNOSIS — B96.89 BV (BACTERIAL VAGINOSIS): ICD-10-CM

## 2024-07-19 DIAGNOSIS — B37.31 YEAST INFECTION OF THE VAGINA: ICD-10-CM

## 2024-07-19 LAB
C TRACH RRNA CVX QL NAA+PROBE: NEGATIVE
CYTOLOGIST CVX/VAG CYTO: NORMAL
CYTOLOGY CVX/VAG DOC CYTO: NORMAL
CYTOLOGY CVX/VAG DOC THIN PREP: NORMAL
HPV I/H RISK 4 DNA CVX QL PROBE+SIG AMP: NEGATIVE
N GONORRHOEA RRNA CVX QL NAA+PROBE: NEGATIVE
NOTE NL11727A: NORMAL
OTHER STN SPEC: NORMAL
STAT OF ADQ CVX/VAG CYTO-IMP: NORMAL

## 2024-07-19 RX ORDER — METRONIDAZOLE 500 MG/1
500 TABLET ORAL 2 TIMES DAILY
Qty: 14 TABLET | Refills: 0 | Status: SHIPPED | OUTPATIENT
Start: 2024-07-19 | End: 2024-07-26

## 2024-07-19 RX ORDER — FLUCONAZOLE 150 MG/1
150 TABLET ORAL DAILY
Qty: 1 TABLET | Refills: 0 | Status: SHIPPED | OUTPATIENT
Start: 2024-07-19

## 2024-07-19 RX ORDER — METRONIDAZOLE 500 MG/1
500 TABLET ORAL 2 TIMES DAILY
Qty: 14 TABLET | Refills: 0 | Status: SHIPPED | OUTPATIENT
Start: 2024-07-19 | End: 2024-07-19 | Stop reason: SDUPTHER

## 2024-07-19 RX ORDER — FLUCONAZOLE 150 MG/1
150 TABLET ORAL DAILY
Qty: 1 TABLET | Refills: 0 | Status: SHIPPED | OUTPATIENT
Start: 2024-07-19 | End: 2024-07-19 | Stop reason: SDUPTHER

## 2024-07-31 ENCOUNTER — RESEARCH ENCOUNTER (OUTPATIENT)
Dept: RESEARCH | Facility: MEDICAL CENTER | Age: 19
End: 2024-07-31
Payer: MEDICAID

## 2024-08-05 ENCOUNTER — APPOINTMENT (OUTPATIENT)
Dept: RADIOLOGY | Facility: MEDICAL CENTER | Age: 19
End: 2024-08-05
Payer: MEDICAID